# Patient Record
Sex: MALE | Race: WHITE | NOT HISPANIC OR LATINO | Employment: PART TIME | URBAN - METROPOLITAN AREA
[De-identification: names, ages, dates, MRNs, and addresses within clinical notes are randomized per-mention and may not be internally consistent; named-entity substitution may affect disease eponyms.]

---

## 2017-10-23 ENCOUNTER — TELEPHONE (OUTPATIENT)
Dept: ORTHOPEDICS | Facility: CLINIC | Age: 22
End: 2017-10-23

## 2017-10-23 NOTE — TELEPHONE ENCOUNTER
----- Message from Leora Smith sent at 10/23/2017  3:04 PM CDT -----  Contact: self  Pt is calling in regards to scheduling an appt for a fracture to his pinky finger on his right hand. The first available appt is on 11/14/17. Pt needs something much sooner.    Pt can be reached at [phone].    Thank you

## 2017-10-23 NOTE — TELEPHONE ENCOUNTER
----- Message from Leora Smith sent at 10/23/2017  3:04 PM CDT -----  Contact: self  Pt is calling in regards to scheduling an appt for a fracture to his pinky finger on his right hand. The first available appt is on 11/14/17. Pt needs something much sooner.    Pt can be reached at [phone].    Thank you

## 2017-10-23 NOTE — TELEPHONE ENCOUNTER
pt has a voice mail box that has not been set up yet. calling to inform pt of appt made for 9:45 am on 10/25/17.

## 2017-10-24 DIAGNOSIS — S62.606A FRACTURE OF UNSPECIFIED PHALANX OF RIGHT LITTLE FINGER, INITIAL ENCOUNTER FOR CLOSED FRACTURE: Primary | ICD-10-CM

## 2017-10-25 ENCOUNTER — OFFICE VISIT (OUTPATIENT)
Dept: ORTHOPEDICS | Facility: CLINIC | Age: 22
End: 2017-10-25
Payer: COMMERCIAL

## 2017-10-25 ENCOUNTER — HOSPITAL ENCOUNTER (OUTPATIENT)
Dept: RADIOLOGY | Facility: OTHER | Age: 22
Discharge: HOME | End: 2017-10-25
Attending: PLASTIC SURGERY
Payer: COMMERCIAL

## 2017-10-25 VITALS
BODY MASS INDEX: 17.23 KG/M2 | SYSTOLIC BLOOD PRESSURE: 102 MMHG | WEIGHT: 130 LBS | DIASTOLIC BLOOD PRESSURE: 66 MMHG | HEART RATE: 76 BPM | HEIGHT: 73 IN

## 2017-10-25 DIAGNOSIS — S62.91XA CLOSED FRACTURE OF RIGHT HAND, INITIAL ENCOUNTER: Primary | ICD-10-CM

## 2017-10-25 DIAGNOSIS — S62.336D CLOSED DISPLACED FRACTURE OF NECK OF RIGHT FIFTH METACARPAL BONE WITH ROUTINE HEALING: Primary | ICD-10-CM

## 2017-10-25 DIAGNOSIS — S62.91XA CLOSED FRACTURE OF RIGHT HAND, INITIAL ENCOUNTER: ICD-10-CM

## 2017-10-25 DIAGNOSIS — S62.606A FRACTURE OF UNSPECIFIED PHALANX OF RIGHT LITTLE FINGER, INITIAL ENCOUNTER FOR CLOSED FRACTURE: ICD-10-CM

## 2017-10-25 PROCEDURE — 99203 OFFICE O/P NEW LOW 30 MIN: CPT | Mod: S$GLB,,, | Performed by: PLASTIC SURGERY

## 2017-10-25 PROCEDURE — 73130 X-RAY EXAM OF HAND: CPT | Mod: 26,RT,, | Performed by: RADIOLOGY

## 2017-10-25 PROCEDURE — 99999 PR PBB SHADOW E&M-EST. PATIENT-LVL II: CPT | Mod: PBBFAC,,, | Performed by: PLASTIC SURGERY

## 2017-10-25 PROCEDURE — 73130 X-RAY EXAM OF HAND: CPT | Mod: TC,RT

## 2017-10-26 NOTE — PROGRESS NOTES
HISTORY OF PRESENT ILLNESS:  Mr. Andrea is a right-hand dominant 21-year-old   male who presents one month after an injury to his right hand after punching a   wall.  He states that one week after the initial injury, he was seen at [facility] where he was referred to a hand specialist.  The hand   specialist then suggested conservative management and/or surgery.  He had an   issue with his insurance and did not seek followup.  He was not placed in a   splint or cast and his insurance is now reinstated and he presents today for   evaluation and recommendations.  He denies any previous history of trauma.  He   did not complain of any pain.  He states he is able to use his hand normally   without issues and has he no other complaints today.    History reviewed. No pertinent past medical history.    History reviewed. No pertinent surgical history.    Social History     Social History    Marital status: Single     Spouse name: N/A    Number of children: N/A    Years of education: N/A     Occupational History    Not on file.     Social History Main Topics    Smoking status: Not on file    Smokeless tobacco: Not on file    Alcohol use Not on file    Drug use: Unknown    Sexual activity: Not on file     Other Topics Concern    Not on file     Social History Narrative    No narrative on file       No current outpatient prescriptions on file prior to visit.     No current facility-administered medications on file prior to visit.        Review of patient's allergies indicates:  No Known Allergies    Review of Systems:  Constitutional: no fever or chills  ENT: no nasal congestion or sore throat  Respiratory: no cough or shortness of breath  Cardiovascular: no chest pain or palpitations  Gastrointestinal: no nausea or vomiting  Genitourinary: no hematuria or dysuria  Integument/Breast: no rash or pruritis  Hematologic/Lymphatic: no easy bruising or lymphadenopathy  Musculoskeletal: see  "HPI  Neurological: no seizures or tremors  Behavioral/Psych: no auditory or visual hallucinations      PHYSICAL EXAM    Vitals:    10/25/17 1020   BP: 102/66   Pulse: 76   Weight: 59 kg (130 lb)   Height: 6' 1" (1.854 m)   PainSc: 0-No pain   PainLoc: Finger         PHYSICAL EXAMINATION:  GENERAL:  No acute distress, alert and oriented x3, cooperative, well nourished.  LUNGS:  Nonlabored, on room air.  CARDIOVASCULAR:  Distal pulses intact.  Good capillary refill.  No clubbing,   cyanosis or edema. RIGHT UPPER EXTREMITY:  He has full active range of motion of   all digits in all joints.  He is neurovascularly intact in the median, radial,   and ulnar distributions.  No scissoring or rotational deformities noted.  There   is a prominence over the right metacarpal neck with minimal tenderness to deep   palpation.  There is no redness or swelling.  There are no signs of instability.    RADIOLOGY IMPRESSION:    HISTORY: right hand fracture    TECHNIQUE: 3 view radiographs of the right hand     COMPARISON: N/A      FINDINGS:     Fracture: Mildly comminuted fracture of the distal metaphysis of the 5th metacarpal with approximately 40 degrees apex dorsal angulation.  Surrounding periosteal new bone formation suggests subacute nature.     Joint Spaces: Normal.     Soft Tissues: Normal.     Other: N/A   Impression       Subacute angulated mildly comminuted metaphyseal fracture of the distal 5th metacarpal.         ASSESSMENT:  Healing right fifth metacarpal neck fracture.    PLAN:  Based on his exam today, the patient has good function of his hand with   no evidence of rotation or scissoring.  He has no pain and it appears that he is   healing on x-ray as well as on exam.  Therefore, I discussed that there would   be nothing to do at this time unless the patient begins to develop symptoms.  I   further discussed that he should avoid trauma to the area for additional month   or two, to allow the bone to completely heal.  The " patient agreed with this.  I   discussed that if he has any issues, he should follow with me pGigirtomer WARREN/WILL  dd: 10/25/2017 12:21:29 (CDT)  td: 10/26/2017 09:17:21 (CDT)  Doc ID   #[id number]  Job ID #[id number]    CC:

## 2023-06-08 ENCOUNTER — OFFICE VISIT (OUTPATIENT)
Dept: URGENT CARE | Facility: CLINIC | Age: 28
End: 2023-06-08
Payer: MEDICAID

## 2023-06-08 VITALS
SYSTOLIC BLOOD PRESSURE: 89 MMHG | HEART RATE: 73 BPM | HEIGHT: 73 IN | TEMPERATURE: 98 F | DIASTOLIC BLOOD PRESSURE: 53 MMHG | BODY MASS INDEX: 19.22 KG/M2 | OXYGEN SATURATION: 97 % | WEIGHT: 145 LBS | RESPIRATION RATE: 17 BRPM

## 2023-06-08 DIAGNOSIS — T15.92XA FB EYE, LEFT, INITIAL ENCOUNTER: Primary | ICD-10-CM

## 2023-06-08 PROCEDURE — 99203 PR OFFICE/OUTPT VISIT, NEW, LEVL III, 30-44 MIN: ICD-10-PCS | Mod: S$GLB,,, | Performed by: FAMILY MEDICINE

## 2023-06-08 PROCEDURE — 99203 OFFICE O/P NEW LOW 30 MIN: CPT | Mod: S$GLB,,, | Performed by: FAMILY MEDICINE

## 2023-06-08 RX ORDER — GENTAMICIN SULFATE 3 MG/ML
1 SOLUTION/ DROPS OPHTHALMIC EVERY 4 HOURS
Qty: 5 ML | Refills: 0 | Status: SHIPPED | OUTPATIENT
Start: 2023-06-08

## 2023-06-09 NOTE — PROGRESS NOTES
"Subjective:      Patient ID: Mervin Andrea III is a 27 y.o. male.    Vitals:  height is 6' 1" (1.854 m) and weight is 65.8 kg (145 lb). His temperature is 97.8 °F (36.6 °C). His blood pressure is 89/53 (abnormal) and his pulse is 73. His respiration is 17 and oxygen saturation is 97%.     Chief Complaint: Foreign Body in Eye    27 year old patient presents eye problem. Patient stated he was working in the shed and think something fell in his left eye was working with wood  4 hour PTA      Foreign Body in Eye  This is a new problem. The current episode started today. The problem has been gradually worsening. Treatments tried: eye drops. ROS   Objective:     Physical Exam   Constitutional: He is oriented to person, place, and time. He appears well-developed. He is cooperative.   HENT:   Head: Normocephalic and atraumatic.   Ears:   Right Ear: Hearing, tympanic membrane, external ear and ear canal normal.   Left Ear: Hearing, tympanic membrane, external ear and ear canal normal.   Nose: Nose normal. No mucosal edema or nasal deformity. No epistaxis. Right sinus exhibits no maxillary sinus tenderness and no frontal sinus tenderness. Left sinus exhibits no maxillary sinus tenderness and no frontal sinus tenderness.   Mouth/Throat: Uvula is midline, oropharynx is clear and moist and mucous membranes are normal. Mucous membranes are moist. No trismus in the jaw. Normal dentition. No uvula swelling. Oropharynx is clear.   Eyes: Conjunctivae and lids are normal. Pupils are equal, round, and reactive to light. Extraocular movement intact      Comments: Left conjunctivae with slight erythema, no FB seen  Tetracaine instilled florescein stained, no uptake  Irrigated using saline     Neck: Trachea normal and phonation normal. Neck supple.   Cardiovascular: Normal rate, regular rhythm, normal heart sounds and normal pulses.   Pulmonary/Chest: Effort normal and breath sounds normal.   Abdominal: Normal appearance and bowel " sounds are normal. Soft.   Musculoskeletal: Normal range of motion.         General: Normal range of motion.   Neurological: He is alert and oriented to person, place, and time. He exhibits normal muscle tone.   Skin: Skin is warm, dry and intact.   Psychiatric: His speech is normal and behavior is normal. Judgment and thought content normal.   Nursing note and vitals reviewed.    Assessment:     1. FB eye, left, initial encounter        Plan:       FB eye, left, initial encounter    Other orders  -     gentamicin (GARAMYCIN) 0.3 % ophthalmic solution; Place 1 drop into the left eye every 4 (four) hours.  Dispense: 5 mL; Refill: 0         If   persistent FB sensation RTC or UC